# Patient Record
Sex: MALE | Race: OTHER | URBAN - METROPOLITAN AREA
[De-identification: names, ages, dates, MRNs, and addresses within clinical notes are randomized per-mention and may not be internally consistent; named-entity substitution may affect disease eponyms.]

---

## 2017-12-04 NOTE — PATIENT DISCUSSION
CATARACTS, OU - NOT VISUALLY SIGNIFICANT. SPECTACLE AND CTL RX GIVEN, FOLLOW. PT GIVEN CTL RX FOR -2.50 OD -1.00 OS AND -2.50 OD  -0.50-0.75x80 OS. PT GIVEN SAMPLES FOR BOTH.

## 2020-03-27 NOTE — PATIENT DISCUSSION
Post-laser (MONALISA) Counseling:  Treatment of retinal breaks with laser greatly reduces but does not completely eliminate the risk of retinal detachment. Keep any scheduled appointments for re-evaluation of the retina, and report any increase in flashing lights or floaters.

## 2020-03-27 NOTE — PATIENT DISCUSSION
RETINA IS ATTACHED OU: PVD OD; RETINAL TEAR OD;  NO HOLES OR TEARS SEEN ON DILATED EXAM OS.  RETINAL DETACHMENT SIGNS AND SYMPTOMS REVIEWED

## 2020-04-08 NOTE — PATIENT DISCUSSION
POST-OP WEEK 1 EXAM: S/P LASER RETINOPEXY OD; Doing well today. Retina attached. IOP within acceptable limits. Retinal detachment and endophthalmitis precautions reviewed. Instructed to call immediately for worsening vision, eye pain, or eye discharge.

## 2020-04-22 NOTE — PATIENT DISCUSSION
POST-OP MONTH 1 EXAM: S/P LASER RETINOPEXY OD. Doing well today. Retina attached. IOP within acceptable limits. Retinal detachment and endophthalmitis precautions reviewed. Instructed to call immediately for worsening vision, eye pain, or eye discharge.

## 2020-06-03 NOTE — PATIENT DISCUSSION
POST-OP MONTH 3 EXAM: S/P LASER RETINOPEXY OD. Doing well today. Retina attached. IOP within acceptable limits. Retinal detachment and endophthalmitis precautions reviewed. Instructed to call immediately for worsening vision, eye pain, or eye discharge.

## 2020-12-09 NOTE — PATIENT DISCUSSION
RETINA IS ATTACHED OU: S/P LASER RETINOPEXY OD; PVD OD; NO NEW HOLES OR TEARS SEEN ON DILATED EXAM TODAY.  RETINAL DETACHMENT SIGNS AND SYMPTOMS REVIEWED

## 2022-10-05 ENCOUNTER — NEW PATIENT (OUTPATIENT)
Dept: URBAN - METROPOLITAN AREA CLINIC 20 | Facility: CLINIC | Age: 52
End: 2022-10-05

## 2022-10-05 DIAGNOSIS — H52.13: ICD-10-CM

## 2022-10-05 PROCEDURE — 92015 DETERMINE REFRACTIVE STATE: CPT

## 2022-10-05 PROCEDURE — 92004 COMPRE OPH EXAM NEW PT 1/>: CPT

## 2022-10-05 ASSESSMENT — KERATOMETRY
OD_AXISANGLE_DEGREES: 42
OS_K1POWER_DIOPTERS: 43.50
OS_AXISANGLE2_DEGREES: 30
OD_K1POWER_DIOPTERS: 43.50
OS_AXISANGLE_DEGREES: 120
OD_AXISANGLE2_DEGREES: 132
OS_K2POWER_DIOPTERS: 44.25
OD_K2POWER_DIOPTERS: 44.50

## 2022-10-05 ASSESSMENT — TONOMETRY
OD_IOP_MMHG: 17
OS_IOP_MMHG: 17

## 2022-10-05 ASSESSMENT — VISUAL ACUITY
OS_SC: 20/20-2
OU_SC: J1
OD_SC: 20/25-2